# Patient Record
Sex: FEMALE | Race: OTHER | Employment: OTHER | URBAN - METROPOLITAN AREA
[De-identification: names, ages, dates, MRNs, and addresses within clinical notes are randomized per-mention and may not be internally consistent; named-entity substitution may affect disease eponyms.]

---

## 2018-06-15 PROBLEM — Z91.09 POLLEN ALLERGIES: Status: ACTIVE | Noted: 2018-06-15

## 2018-06-15 PROBLEM — F33.1 MODERATE EPISODE OF RECURRENT MAJOR DEPRESSIVE DISORDER (HCC): Status: ACTIVE | Noted: 2018-06-15

## 2018-06-15 PROBLEM — G43.909 MIGRAINE SYNDROME: Status: ACTIVE | Noted: 2018-06-15

## 2018-09-03 PROBLEM — F51.05 INSOMNIA DUE TO MENTAL DISORDER: Status: ACTIVE | Noted: 2018-09-03

## 2018-09-03 PROBLEM — F41.9 ANXIETY DISORDER: Status: ACTIVE | Noted: 2018-09-03

## 2018-09-03 PROBLEM — F33.2 SEVERE EPISODE OF RECURRENT MAJOR DEPRESSIVE DISORDER, WITHOUT PSYCHOTIC FEATURES (HCC): Status: ACTIVE | Noted: 2018-09-03

## 2018-09-03 PROBLEM — F45.42 PAIN DISORDER ASSOCIATED WITH PSYCHOLOGICAL FACTORS AND MEDICAL CONDITION: Status: ACTIVE | Noted: 2018-09-03

## 2018-12-28 ENCOUNTER — HOSPITAL ENCOUNTER (OUTPATIENT)
Dept: MAMMOGRAPHY | Age: 53
Discharge: HOME | End: 2018-12-28
Payer: COMMERCIAL

## 2018-12-28 DIAGNOSIS — Z12.31 BREAST CANCER SCREENING BY MAMMOGRAM: ICD-10-CM

## 2018-12-28 PROCEDURE — 77067 SCR MAMMO BI INCL CAD: CPT

## 2019-01-07 NOTE — PROGRESS NOTES
Dear  Suzanne Basim     Your recent mammogram showed :No mammographic evidence of malignancy.      Recommend annual mammogram in one year.  A reminder letter will be scheduled.     Thanks,  Dr. Ophelia Schaefer

## 2019-01-07 NOTE — PROGRESS NOTES
Dear  Shayan Jerzy     Your recent mammogram showed :No mammographic evidence of malignancy.      Recommend annual mammogram in one year.  A reminder letter will be scheduled.     Thanks,  Dr. Jean Nesbitt

## 2019-05-07 ENCOUNTER — TELEPHONE (OUTPATIENT)
Dept: NUTRITION | Age: 54
End: 2019-05-07

## 2019-05-07 NOTE — TELEPHONE ENCOUNTER
Nutrition Counseling: Contacted pt on 4/8/19, 4/9/19, and 5/7/19. See notes documented in Nutrition Counseling Referral for details. No further follow-up contact from pt. Will close referral for this office.     [address]  [phone]

## 2021-02-18 ENCOUNTER — HOSPITAL ENCOUNTER (OUTPATIENT)
Dept: LAB | Age: 56
Discharge: HOME | End: 2021-02-18
Attending: PSYCHIATRY & NEUROLOGY
Payer: MEDICARE

## 2021-02-18 DIAGNOSIS — Z51.81 THERAPEUTIC DRUG MONITORING: ICD-10-CM

## 2021-02-18 LAB
ALBUMIN SERPL-MCNC: 4.2 G/DL (ref 3.5–5)
ALBUMIN/GLOB SERPL: 1.1 {RATIO} (ref 1.2–3.5)
ALP SERPL-CCNC: 122 U/L (ref 50–130)
ALT SERPL-CCNC: 57 U/L (ref 12–65)
AST SERPL-CCNC: 30 U/L (ref 15–37)
BILIRUB DIRECT SERPL-MCNC: 0.1 MG/DL
BILIRUB SERPL-MCNC: 0.5 MG/DL (ref 0.2–1.1)
GLOBULIN SER CALC-MCNC: 3.8 G/DL (ref 2.3–3.5)
PROT SERPL-MCNC: 8 G/DL (ref 6.3–8.2)

## 2021-02-18 PROCEDURE — 80076 HEPATIC FUNCTION PANEL: CPT

## 2021-02-18 PROCEDURE — 36415 COLL VENOUS BLD VENIPUNCTURE: CPT

## 2021-04-14 ENCOUNTER — TRANSCRIBE ORDER (OUTPATIENT)
Dept: SCHEDULING | Age: 56
End: 2021-04-14

## 2021-04-14 DIAGNOSIS — Z12.31 VISIT FOR SCREENING MAMMOGRAM: Primary | ICD-10-CM

## 2021-09-30 ENCOUNTER — TRANSCRIBE ORDER (OUTPATIENT)
Dept: SCHEDULING | Age: 56
End: 2021-09-30

## 2021-09-30 DIAGNOSIS — Z78.0 MENOPAUSE: ICD-10-CM

## 2021-09-30 DIAGNOSIS — Z12.31 VISIT FOR SCREENING MAMMOGRAM: Primary | ICD-10-CM

## 2022-03-18 PROBLEM — G43.909 MIGRAINE SYNDROME: Status: ACTIVE | Noted: 2018-06-15

## 2022-03-18 PROBLEM — F45.42 PAIN DISORDER ASSOCIATED WITH PSYCHOLOGICAL FACTORS AND MEDICAL CONDITION: Status: ACTIVE | Noted: 2018-09-03

## 2022-03-19 PROBLEM — F51.05 INSOMNIA DUE TO MENTAL DISORDER: Status: ACTIVE | Noted: 2018-09-03

## 2022-03-19 PROBLEM — F33.2 SEVERE EPISODE OF RECURRENT MAJOR DEPRESSIVE DISORDER, WITHOUT PSYCHOTIC FEATURES (HCC): Status: ACTIVE | Noted: 2018-09-03

## 2022-03-19 PROBLEM — F41.9 ANXIETY DISORDER: Status: ACTIVE | Noted: 2018-09-03

## 2022-03-19 PROBLEM — Z91.09 POLLEN ALLERGIES: Status: ACTIVE | Noted: 2018-06-15

## 2022-03-19 PROBLEM — F33.1 MODERATE EPISODE OF RECURRENT MAJOR DEPRESSIVE DISORDER (HCC): Status: ACTIVE | Noted: 2018-06-15

## 2022-04-20 PROBLEM — E11.9 TYPE 2 DIABETES MELLITUS (HCC): Status: ACTIVE | Noted: 2022-04-20

## 2022-08-03 ENCOUNTER — TELEMEDICINE (OUTPATIENT)
Dept: BEHAVIORAL/MENTAL HEALTH CLINIC | Age: 57
End: 2022-08-03

## 2022-08-03 DIAGNOSIS — G89.4 CHRONIC PAIN DISORDER: ICD-10-CM

## 2022-08-03 DIAGNOSIS — F41.1 GENERALIZED ANXIETY DISORDER: ICD-10-CM

## 2022-08-03 DIAGNOSIS — F34.1 DYSTHYMIC DISORDER: ICD-10-CM

## 2022-08-03 DIAGNOSIS — F33.1 MODERATE EPISODE OF RECURRENT MAJOR DEPRESSIVE DISORDER (HCC): Primary | ICD-10-CM

## 2022-08-03 DIAGNOSIS — F51.05 INSOMNIA DUE TO MENTAL DISORDER: ICD-10-CM

## 2022-08-03 DIAGNOSIS — Z63.4 BEREAVEMENT: ICD-10-CM

## 2022-08-03 PROCEDURE — 99213 OFFICE O/P EST LOW 20 MIN: CPT | Performed by: PSYCHIATRY & NEUROLOGY

## 2022-08-03 PROCEDURE — 90833 PSYTX W PT W E/M 30 MIN: CPT | Performed by: PSYCHIATRY & NEUROLOGY

## 2022-08-03 RX ORDER — BUPROPION HYDROCHLORIDE 150 MG/1
150 TABLET ORAL EVERY MORNING
Qty: 30 TABLET | Refills: 2 | Status: SHIPPED | OUTPATIENT
Start: 2022-08-03 | End: 2022-11-02 | Stop reason: SDUPTHER

## 2022-08-03 RX ORDER — EMPAGLIFLOZIN, METFORMIN HYDROCHLORIDE 5; 1000 MG/1; MG/1
TABLET, EXTENDED RELEASE ORAL
COMMUNITY
Start: 2021-02-17

## 2022-08-03 RX ORDER — GABAPENTIN 600 MG/1
600 TABLET ORAL NIGHTLY
COMMUNITY
Start: 2021-09-29

## 2022-08-03 RX ORDER — TRAZODONE HYDROCHLORIDE 100 MG/1
200 TABLET ORAL NIGHTLY
Qty: 60 TABLET | Refills: 2 | Status: SHIPPED | OUTPATIENT
Start: 2022-08-03 | End: 2022-11-02 | Stop reason: SDUPTHER

## 2022-08-03 RX ORDER — ONDANSETRON 8 MG/1
8 TABLET, ORALLY DISINTEGRATING ORAL 3 TIMES DAILY
COMMUNITY
Start: 2021-12-23

## 2022-08-03 RX ORDER — DULOXETIN HYDROCHLORIDE 60 MG/1
CAPSULE, DELAYED RELEASE ORAL
Qty: 30 CAPSULE | Refills: 2 | Status: SHIPPED | OUTPATIENT
Start: 2022-08-03 | End: 2022-11-02 | Stop reason: SDUPTHER

## 2022-08-03 RX ORDER — DULOXETIN HYDROCHLORIDE 30 MG/1
CAPSULE, DELAYED RELEASE ORAL
Qty: 30 CAPSULE | Refills: 3 | Status: SHIPPED | OUTPATIENT
Start: 2022-08-03 | End: 2022-11-02 | Stop reason: SDUPTHER

## 2022-08-03 RX ORDER — SUMATRIPTAN 100 MG/1
TABLET, FILM COATED ORAL
COMMUNITY
Start: 2021-02-16

## 2022-08-03 RX ORDER — LORAZEPAM 0.5 MG/1
0.5 TABLET ORAL 2 TIMES DAILY PRN
Qty: 60 TABLET | Refills: 2 | Status: SHIPPED | OUTPATIENT
Start: 2022-08-03 | End: 2022-11-01

## 2022-08-03 SDOH — SOCIAL STABILITY - SOCIAL INSECURITY: DISSAPEARANCE AND DEATH OF FAMILY MEMBER: Z63.4

## 2022-08-03 ASSESSMENT — ANXIETY QUESTIONNAIRES
7. FEELING AFRAID AS IF SOMETHING AWFUL MIGHT HAPPEN: 3
GAD7 TOTAL SCORE: 17
2. NOT BEING ABLE TO STOP OR CONTROL WORRYING: 3
6. BECOMING EASILY ANNOYED OR IRRITABLE: 1
5. BEING SO RESTLESS THAT IT IS HARD TO SIT STILL: 3
4. TROUBLE RELAXING: 1
1. FEELING NERVOUS, ANXIOUS, OR ON EDGE: 3
IF YOU CHECKED OFF ANY PROBLEMS ON THIS QUESTIONNAIRE, HOW DIFFICULT HAVE THESE PROBLEMS MADE IT FOR YOU TO DO YOUR WORK, TAKE CARE OF THINGS AT HOME, OR GET ALONG WITH OTHER PEOPLE: VERY DIFFICULT
3. WORRYING TOO MUCH ABOUT DIFFERENT THINGS: 3

## 2022-08-03 ASSESSMENT — PATIENT HEALTH QUESTIONNAIRE - PHQ9
SUM OF ALL RESPONSES TO PHQ QUESTIONS 1-9: 16
6. FEELING BAD ABOUT YOURSELF - OR THAT YOU ARE A FAILURE OR HAVE LET YOURSELF OR YOUR FAMILY DOWN: 0
10. IF YOU CHECKED OFF ANY PROBLEMS, HOW DIFFICULT HAVE THESE PROBLEMS MADE IT FOR YOU TO DO YOUR WORK, TAKE CARE OF THINGS AT HOME, OR GET ALONG WITH OTHER PEOPLE: 0
3. TROUBLE FALLING OR STAYING ASLEEP: 3
4. FEELING TIRED OR HAVING LITTLE ENERGY: 3
SUM OF ALL RESPONSES TO PHQ QUESTIONS 1-9: 16
7. TROUBLE CONCENTRATING ON THINGS, SUCH AS READING THE NEWSPAPER OR WATCHING TELEVISION: 3
SUM OF ALL RESPONSES TO PHQ QUESTIONS 1-9: 16
1. LITTLE INTEREST OR PLEASURE IN DOING THINGS: 0
9. THOUGHTS THAT YOU WOULD BE BETTER OFF DEAD, OR OF HURTING YOURSELF: 0
2. FEELING DOWN, DEPRESSED OR HOPELESS: 3
8. MOVING OR SPEAKING SO SLOWLY THAT OTHER PEOPLE COULD HAVE NOTICED. OR THE OPPOSITE, BEING SO FIGETY OR RESTLESS THAT YOU HAVE BEEN MOVING AROUND A LOT MORE THAN USUAL: 3
SUM OF ALL RESPONSES TO PHQ QUESTIONS 1-9: 16
5. POOR APPETITE OR OVEREATING: 1
SUM OF ALL RESPONSES TO PHQ9 QUESTIONS 1 & 2: 3

## 2022-08-03 ASSESSMENT — COLUMBIA-SUICIDE SEVERITY RATING SCALE - C-SSRS
1. WITHIN THE PAST MONTH, HAVE YOU WISHED YOU WERE DEAD OR WISHED YOU COULD GO TO SLEEP AND NOT WAKE UP?: NO
2. HAVE YOU ACTUALLY HAD ANY THOUGHTS OF KILLING YOURSELF?: NO
6. HAVE YOU EVER DONE ANYTHING, STARTED TO DO ANYTHING, OR PREPARED TO DO ANYTHING TO END YOUR LIFE?: NO

## 2022-08-03 NOTE — PROGRESS NOTES
difficult at all       WARREN-7 SCREENING 8/3/2022 1/26/2022 10/7/2021   Feeling nervous, anxious, or on edge Nearly every day - -   Not being able to stop or control worrying Nearly every day - -   Worrying too much about different things Nearly every day - -   Trouble relaxing Several days - -   Being so restless that it is hard to sit still Nearly every day - -   Becoming easily annoyed or irritable Several days - -   Feeling afraid as if something awful might happen Nearly every day - -   WARREN-7 Total Score 17 - -   How difficult have these problems made it for you to do your work, take care of things at home, or get along with other people? Very difficult Extremely difficult Very difficult   Feeling nervous, anxious, or on edge - Several days Several days   WARREN-7 Total Score - 6 13        I was at home while conducting this encounter. Consent:  She and/or her healthcare decision maker is aware that this patient-initiated Telehealth encounter is a billable service, with coverage as determined by her insurance carrier. She is aware that she may receive a bill and has provided verbal consent to proceed: YesPatient identification was verified, and a caregiver was present when appropriate. The patient was located in a state where the provider was credentialed to provide care. This virtual visit was conducted via [place]. Pursuant to the emergency declaration under the [facility], [facility] waiver authority and the [employer] General Act, this Virtual  Visit was conducted to reduce the patient's risk of exposure to COVID-19 and provide continuity of care for an established patient. Services were provided through a video synchronous discussion virtually to substitute for in-person clinic visit. Due to this being a TeleHealth evaluation, many elements of the physical examination are unable to be assessed.      Total Time: minutes: 11-20 minutes. Chief complaint:  Pt says has been bad. Subjective:   Seen virtually for follow-up. States not doing too good. Very depressed and anxious. Was in [place] in  for 1 week to attend a  of her oldest brothers fausto. She was like her grandmother and was 80 yrs old. Her mom has Alzheimer's and is not doing good. Her brother is in [facility], he has a restraining order. He drank and took some pills and was taken to mental hospital then the [facility]. Mom used to get upset and went to the streets screaming and yelling.   has been stressed because of his daughter. She has to be better for her  too. He is okay sometimes. Other times he cries. States her daughter comes and brings her grandchildren and that helps. She has a friend she contacted. Supportive psychotherapy provided. Patient allowed to vent her emotions. She denies suicidal ideation/homicidal ideations. Denies symptoms of psychosis. 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 In no acute distress. MEDICATION REVIEW:    Current Medications:    Current Outpatient Medications   Medication Sig    gabapentin (NEURONTIN) 600 MG tablet Take 600 mg by mouth nightly. Empagliflozin-metFORMIN HCl ER (SYNJARDY XR) 5-1000 MG TB24     ondansetron (ZOFRAN-ODT) 8 MG TBDP disintegrating tablet Place 8 mg under the tongue in the morning and 8 mg at noon and 8 mg before bedtime.     SUMAtriptan (IMITREX) 100 MG tablet     buPROPion (WELLBUTRIN XL) 150 MG extended release tablet Take 1 tablet by mouth every morning    DULoxetine (CYMBALTA) 60 MG extended release capsule Take 60 mg once daily in the morning and 30 mg in the afternoon    LORazepam (ATIVAN) 0.5 MG tablet Take 1 tablet by mouth 2 times daily as needed for Anxiety for up to 90 days. traZODone (DESYREL) 100 MG tablet Take 2 tablets by mouth nightly    DULoxetine (CYMBALTA) 30 MG extended release capsule Take 60 mg once daily in the morning and 30 mg in the afternoon    Lancets MISC Check BS 2 times day ( Before breakfast and 1 to 2 hours after lunch and dinner) Dx: E11.65# 120 - RF x 11    albuterol sulfate  (90 Base) MCG/ACT inhaler Inhale 2 puffs into the lungs    atorvastatin (LIPITOR) 10 MG tablet Take 10 mg by mouth daily    cyclobenzaprine (FLEXERIL) 10 MG tablet Take 10 mg by mouth daily as needed    fenofibrate (TRICOR) 145 MG tablet Take 145 mg by mouth daily    ibuprofen (ADVIL;MOTRIN) 600 MG tablet Take 600 mg by mouth    loratadine (CLARITIN) 10 MG tablet Take 10 mg by mouth    metFORMIN (GLUCOPHAGE) 850 MG tablet Take 850 mg by mouth    metoprolol tartrate (LOPRESSOR) 25 MG tablet Take by mouth 2 times daily    mometasone (NASONEX) 50 MCG/ACT nasal spray 2 sprays daily    montelukast (SINGULAIR) 10 MG tablet Take 10 mg by mouth    ergocalciferol (ERGOCALCIFEROL) 1.25 MG (45537 UT) capsule Take 50,000 Units by mouth every 7 days (Patient not taking: Reported on 8/3/2022)     No current facility-administered medications for this visit. Allergies   Allergen Reactions    Acetaminophen Other (See Comments)    Codeine Rash       Past Medical History, Past Surgical History, Family history, Social History, and Medications were all reviewed with the patient today and updated as necessary.      Compliant with medication: Yes   Side effects from medications:  No     EXAMINATION  Musculoskeletal    GAIT AND STATION   [x] WNL   [] RESTRICTED   [] UNSTEADY WALK        [] ABNORMAL   [] UNBALANCED         PSYCHIATRIC     GENERAL APPEARANCE:   [x]  WELL GROOMED []     DISHEVELED   []  UNKEMPT      []  UNUSUAL/BIZZARE    [] WNL       ATTITUDE:   [x] COOPERATIVE   [] GUARDED   [] SUSPICIOUS      [] HOSTILE                            BEHAVIOR:   [x] CALM   [] HYPERACTIVE   [] MANNERISMS      [] BIZZARE         SPEECH:   [x] NORMAL FOR CLIENT   [] SPONTANEOUS   [] SLURRED   [] WHISPERING      [] LOUD   [] PRESSURED   [] ARTICULATE       EYE CONTACT:   [x] WNL   [] BLANK STARE   [] INTENSE      [] AVOIDANT         MOOD:   [] EUTHYMIC   [x] ANXIOUS   [x] DEPRESSED      [] IRRITABLE   [] ANGRY   [] APATHETIC     AFFECT:   [x] CONGRUENT WITH MOOD   [] FLAT   [] CONSTRICTED      [] INAPPROPRIATE   [] LABILE           THOUGHT PROCESS:   [x] LOGICAL/GOAL-DIRECTED   [] FOI   [] CIRCUMSANTIAL      [] INCOHERENT   [] TANGENTIAL   [] CONCRETE      [] PERSEVERATION           THOUGHT CONTENT:                DELUSIONS  [x] DENIES  [] GRANDIOSE  [] PERSECUTORY  [] Denominational  [] REFERENCE   HALLUCINATIONS  [x] DENIES  [] AUDITORY  [] VISUAL  [] OLFACTORY  [] TACTILE     [] GUSTATORY  [] SOMATIC         OBSESSIONS  [x] DENIES  [] PRESENT         SUICIDAL IDEATION  [x] DENIES  [] PRESENT W/O PLAN  [] PRESENT W/ PLAN       HOMICIDAL IDEATION  [x] DENIES  [] PRESENT W/O PLAN  [] PRESENT W/ PLAN           JUDGEMENT:   [x] GOOD   [] FAIR   [] POOR   INSIGHT:   [x] GOOD   [] FAIR   [] POOR     COGNITION:           SENSORIUM:   [x] ALERT   [] CLOUDED   [] DROWSY     ORIENTATION:   [x] INTACT   [] TIME:  PLACE  PERSON   RECENT & REMOTE MEMORY:   [] NORMAL   [x] OTHER:                  ATTENTION:   [x] INTACT   [] MILD IMPAIRMENT   [] SEVERE IMPAIRMENT     CONCENTRATION:   [x] INTACT   [] MILD IMPAIRMENT   [] SEVERE IMPAIRMENT     LANGUAGE:   [x] AVERAGE   [] ABOVE AVERAGE   [] BELOW AVERAGE     FUND OF KNOWLEDGE:   [] UNABLE TO ASSESS AT THIS TIME   [x] AVERAGE   [] ABOVE AVERAGE   [] BELOW AVERAGE      [] GOOD TO EXCELLENT KNOWLEDGE OF CURRENT EVENTS   [] POOR TO NO KNLEDGE OF CURRENT EVENTS           ABNORMAL MOVEMENTS:   [x] NONE   [] TICS   [] TREMORS   [] BIZZARE      [] FACE   [] TRUNK   [] EXTREMETIES   [] GESTURES        SLEEP:   [] GOOD   [] FAIR   [x] POOR      MUSCLE STRENGTH AND TONE   [x] WNL   [] ATROPHY   [] SPASTIC        [] FLACCID   [] COGWHEEL         Diagnoses/Impressions:    ICD-10-CM    1. Moderate episode of recurrent major depressive disorder (HCC)  F33.1       2. Generalized anxiety disorder  F41.1 LORazepam (ATIVAN) 0.5 MG tablet      3. Insomnia due to mental disorder  F51.05       4. Chronic pain disorder  G89.4       5. Dysthymic disorder  F34.1       6. Bereavement  Z63.4           TREATMENT GOALS:  Symptom reduction, Medication adherence, maintain therapeutic gains    LABS/IMAGING:    []  Ordered [x]  Reviewed []  New Labs Ordered:     LAB  Sodium   Date/Time Value Ref Range Status   05/22/2020 10:19  134 - 144 mmol/L Final     Potassium   Date/Time Value Ref Range Status   05/22/2020 10:19 AM 4.4 3.5 - 5.2 mmol/L Final     Chloride   Date/Time Value Ref Range Status   05/22/2020 10:19  96 - 106 mmol/L Final     CO2   Date/Time Value Ref Range Status   05/22/2020 10:19 AM 21 20 - 29 mmol/L Final     BUN   Date/Time Value Ref Range Status   05/22/2020 10:19 AM 14 6 - 24 mg/dL Final     ALT   Date/Time Value Ref Range Status   02/18/2021 12:20 PM 57 12 - 65 U/L Final     AST   Date/Time Value Ref Range Status   02/18/2021 12:20 PM 30 15 - 37 U/L Final     TSH   Date/Time Value Ref Range Status   02/06/2020 10:33 AM 0.728 0.450 - 4.500 uIU/mL Final       Please refer to the lab tab in the epic and care everywhere for the most recent lab results. Plan:     [x]  Medication ordered: cymbalta, Wellbutrin, lorazepam, trazodone to target depression, anxiety, insomnia.     [x]  Medication education/counseling provided  Medication dosage and time to take, purpose/expected benefits/risks, common side effects, lab monitoring required and reason, E.R. if they are experiencing a medical emergency or suicidal ideations/homicidal ideations. **  All ancillary documentation entered reviewed by provider. PLEASE NOTE:  This document has been produced in part or whole using voice recognition software. Proofread however unrecognized errors in transcription may be present.         Omaira Blackwell MD

## 2022-11-02 ENCOUNTER — TELEMEDICINE (OUTPATIENT)
Dept: BEHAVIORAL/MENTAL HEALTH CLINIC | Age: 57
End: 2022-11-02
Payer: MEDICARE

## 2022-11-02 DIAGNOSIS — F51.05 INSOMNIA DUE TO MENTAL DISORDER: ICD-10-CM

## 2022-11-02 DIAGNOSIS — G89.4 CHRONIC PAIN DISORDER: ICD-10-CM

## 2022-11-02 DIAGNOSIS — F41.1 GENERALIZED ANXIETY DISORDER: ICD-10-CM

## 2022-11-02 DIAGNOSIS — F33.1 MODERATE EPISODE OF RECURRENT MAJOR DEPRESSIVE DISORDER (HCC): Primary | ICD-10-CM

## 2022-11-02 DIAGNOSIS — F34.1 DYSTHYMIC DISORDER: ICD-10-CM

## 2022-11-02 PROCEDURE — 3017F COLORECTAL CA SCREEN DOC REV: CPT | Performed by: PSYCHIATRY & NEUROLOGY

## 2022-11-02 PROCEDURE — G8427 DOCREV CUR MEDS BY ELIG CLIN: HCPCS | Performed by: PSYCHIATRY & NEUROLOGY

## 2022-11-02 PROCEDURE — 99214 OFFICE O/P EST MOD 30 MIN: CPT | Performed by: PSYCHIATRY & NEUROLOGY

## 2022-11-02 RX ORDER — ATORVASTATIN CALCIUM 80 MG/1
TABLET, FILM COATED ORAL
COMMUNITY
Start: 2022-10-31

## 2022-11-02 RX ORDER — BUPROPION HYDROCHLORIDE 150 MG/1
150 TABLET ORAL EVERY MORNING
Qty: 90 TABLET | Refills: 1 | Status: SHIPPED | OUTPATIENT
Start: 2022-11-02

## 2022-11-02 RX ORDER — DULOXETIN HYDROCHLORIDE 30 MG/1
CAPSULE, DELAYED RELEASE ORAL
Qty: 90 CAPSULE | Refills: 1 | Status: SHIPPED | OUTPATIENT
Start: 2022-11-02

## 2022-11-02 RX ORDER — ICOSAPENT ETHYL 1000 MG/1
CAPSULE ORAL
COMMUNITY
Start: 2022-11-01

## 2022-11-02 RX ORDER — TRAZODONE HYDROCHLORIDE 100 MG/1
200 TABLET ORAL NIGHTLY
Qty: 180 TABLET | Refills: 1 | Status: SHIPPED | OUTPATIENT
Start: 2022-11-02

## 2022-11-02 RX ORDER — DULOXETIN HYDROCHLORIDE 60 MG/1
CAPSULE, DELAYED RELEASE ORAL
Qty: 90 CAPSULE | Refills: 1 | Status: SHIPPED | OUTPATIENT
Start: 2022-11-02

## 2022-11-02 ASSESSMENT — ANXIETY QUESTIONNAIRES
4. TROUBLE RELAXING: 3
GAD7 TOTAL SCORE: 14
7. FEELING AFRAID AS IF SOMETHING AWFUL MIGHT HAPPEN: 0
1. FEELING NERVOUS, ANXIOUS, OR ON EDGE: 1
5. BEING SO RESTLESS THAT IT IS HARD TO SIT STILL: 3
IF YOU CHECKED OFF ANY PROBLEMS ON THIS QUESTIONNAIRE, HOW DIFFICULT HAVE THESE PROBLEMS MADE IT FOR YOU TO DO YOUR WORK, TAKE CARE OF THINGS AT HOME, OR GET ALONG WITH OTHER PEOPLE: NOT DIFFICULT AT ALL
3. WORRYING TOO MUCH ABOUT DIFFERENT THINGS: 3
6. BECOMING EASILY ANNOYED OR IRRITABLE: 3
2. NOT BEING ABLE TO STOP OR CONTROL WORRYING: 1

## 2022-11-02 ASSESSMENT — PATIENT HEALTH QUESTIONNAIRE - PHQ9
SUM OF ALL RESPONSES TO PHQ QUESTIONS 1-9: 17
SUM OF ALL RESPONSES TO PHQ QUESTIONS 1-9: 17
3. TROUBLE FALLING OR STAYING ASLEEP: 3
9. THOUGHTS THAT YOU WOULD BE BETTER OFF DEAD, OR OF HURTING YOURSELF: 0
SUM OF ALL RESPONSES TO PHQ QUESTIONS 1-9: 17
5. POOR APPETITE OR OVEREATING: 0
2. FEELING DOWN, DEPRESSED OR HOPELESS: 3
7. TROUBLE CONCENTRATING ON THINGS, SUCH AS READING THE NEWSPAPER OR WATCHING TELEVISION: 3
1. LITTLE INTEREST OR PLEASURE IN DOING THINGS: 3
SUM OF ALL RESPONSES TO PHQ9 QUESTIONS 1 & 2: 6
4. FEELING TIRED OR HAVING LITTLE ENERGY: 1
6. FEELING BAD ABOUT YOURSELF - OR THAT YOU ARE A FAILURE OR HAVE LET YOURSELF OR YOUR FAMILY DOWN: 1
8. MOVING OR SPEAKING SO SLOWLY THAT OTHER PEOPLE COULD HAVE NOTICED. OR THE OPPOSITE, BEING SO FIGETY OR RESTLESS THAT YOU HAVE BEEN MOVING AROUND A LOT MORE THAN USUAL: 3
10. IF YOU CHECKED OFF ANY PROBLEMS, HOW DIFFICULT HAVE THESE PROBLEMS MADE IT FOR YOU TO DO YOUR WORK, TAKE CARE OF THINGS AT HOME, OR GET ALONG WITH OTHER PEOPLE: 2
SUM OF ALL RESPONSES TO PHQ QUESTIONS 1-9: 17
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
## 2023-02-23 ENCOUNTER — TELEPHONE (OUTPATIENT)
Dept: BEHAVIORAL/MENTAL HEALTH CLINIC | Age: 58
End: 2023-02-23

## 2023-02-23 NOTE — TELEPHONE ENCOUNTER
Patient had to go to [place] to be with her mom as she had a heart attack. Patient is unsure when she will return but will call back to reschedule appt when she does. Is requesting 90 day supply of refills to be sent to [facility] order pharmacy.

## 2023-02-25 RX ORDER — TRAZODONE HYDROCHLORIDE 100 MG/1
200 TABLET ORAL NIGHTLY
Qty: 180 TABLET | Refills: 1 | Status: SHIPPED | OUTPATIENT
Start: 2023-02-25

## 2023-02-25 RX ORDER — DULOXETIN HYDROCHLORIDE 60 MG/1
CAPSULE, DELAYED RELEASE ORAL
Qty: 90 CAPSULE | Refills: 1 | Status: SHIPPED | OUTPATIENT
Start: 2023-02-25

## 2023-02-25 RX ORDER — DULOXETIN HYDROCHLORIDE 30 MG/1
CAPSULE, DELAYED RELEASE ORAL
Qty: 90 CAPSULE | Refills: 1 | Status: SHIPPED | OUTPATIENT
Start: 2023-02-25

## 2023-02-25 RX ORDER — BUPROPION HYDROCHLORIDE 150 MG/1
150 TABLET ORAL EVERY MORNING
Qty: 90 TABLET | Refills: 1 | Status: SHIPPED | OUTPATIENT
Start: 2023-02-25

## 2023-10-05 ENCOUNTER — TELEPHONE (OUTPATIENT)
Dept: PRIMARY CARE CLINIC | Facility: CLINIC | Age: 58
End: 2023-10-05

## 2023-10-05 NOTE — TELEPHONE ENCOUNTER
Pt last office visit 11/02/2022, pt had to go back to her country, now pt is in [place] wanting a soon apt, next apt 01/25/2024 (more then 1 year) pt can not do morning apt do to her taking care of mother,pt dont want to come in to office, please call pt back thank you.

## 2023-10-19 ENCOUNTER — TELEMEDICINE (OUTPATIENT)
Dept: BEHAVIORAL/MENTAL HEALTH CLINIC | Age: 58
End: 2023-10-19
Payer: MEDICARE

## 2023-10-19 DIAGNOSIS — Z63.0 PROBLEMS IN RELATIONSHIP WITH SPOUSE OR PARTNER: ICD-10-CM

## 2023-10-19 DIAGNOSIS — Z65.8 PSYCHOSOCIAL STRESSORS: ICD-10-CM

## 2023-10-19 DIAGNOSIS — F41.1 GENERALIZED ANXIETY DISORDER: ICD-10-CM

## 2023-10-19 DIAGNOSIS — G89.4 CHRONIC PAIN DISORDER: ICD-10-CM

## 2023-10-19 DIAGNOSIS — F34.1 DYSTHYMIC DISORDER: ICD-10-CM

## 2023-10-19 DIAGNOSIS — F51.05 INSOMNIA DUE TO MENTAL DISORDER: ICD-10-CM

## 2023-10-19 DIAGNOSIS — F33.0 MILD EPISODE OF RECURRENT MAJOR DEPRESSIVE DISORDER (HCC): Primary | ICD-10-CM

## 2023-10-19 PROCEDURE — 3017F COLORECTAL CA SCREEN DOC REV: CPT | Performed by: PSYCHIATRY & NEUROLOGY

## 2023-10-19 PROCEDURE — 90833 PSYTX W PT W E/M 30 MIN: CPT | Performed by: PSYCHIATRY & NEUROLOGY

## 2023-10-19 PROCEDURE — 99213 OFFICE O/P EST LOW 20 MIN: CPT | Performed by: PSYCHIATRY & NEUROLOGY

## 2023-10-19 PROCEDURE — G8427 DOCREV CUR MEDS BY ELIG CLIN: HCPCS | Performed by: PSYCHIATRY & NEUROLOGY

## 2023-10-19 RX ORDER — DULOXETIN HYDROCHLORIDE 30 MG/1
CAPSULE, DELAYED RELEASE ORAL
Qty: 90 CAPSULE | Refills: 1 | Status: SHIPPED | OUTPATIENT
Start: 2023-10-19

## 2023-10-19 RX ORDER — DULOXETIN HYDROCHLORIDE 60 MG/1
CAPSULE, DELAYED RELEASE ORAL
Qty: 90 CAPSULE | Refills: 1 | Status: SHIPPED | OUTPATIENT
Start: 2023-10-19

## 2023-10-19 RX ORDER — TRAZODONE HYDROCHLORIDE 100 MG/1
100 TABLET ORAL NIGHTLY
Qty: 90 TABLET | Refills: 1 | Status: SHIPPED | OUTPATIENT
Start: 2023-10-19

## 2023-10-19 RX ORDER — LORAZEPAM 0.5 MG/1
0.5 TABLET ORAL
COMMUNITY

## 2023-10-19 RX ORDER — CEPHALEXIN 500 MG/1
CAPSULE ORAL
COMMUNITY
Start: 2023-10-09

## 2023-10-19 RX ORDER — BUPROPION HYDROCHLORIDE 150 MG/1
150 TABLET ORAL EVERY MORNING
Qty: 90 TABLET | Refills: 1 | Status: SHIPPED | OUTPATIENT
Start: 2023-10-19

## 2023-10-19 SDOH — SOCIAL STABILITY - SOCIAL INSECURITY: PROBLEMS IN RELATIONSHIP WITH SPOUSE OR PARTNER: Z63.0

## 2023-10-19 ASSESSMENT — PATIENT HEALTH QUESTIONNAIRE - PHQ9
SUM OF ALL RESPONSES TO PHQ QUESTIONS 1-9: 14
6. FEELING BAD ABOUT YOURSELF - OR THAT YOU ARE A FAILURE OR HAVE LET YOURSELF OR YOUR FAMILY DOWN: 0
5. POOR APPETITE OR OVEREATING: 1
4. FEELING TIRED OR HAVING LITTLE ENERGY: 3
SUM OF ALL RESPONSES TO PHQ9 QUESTIONS 1 & 2: 1
SUM OF ALL RESPONSES TO PHQ QUESTIONS 1-9: 14
10. IF YOU CHECKED OFF ANY PROBLEMS, HOW DIFFICULT HAVE THESE PROBLEMS MADE IT FOR YOU TO DO YOUR WORK, TAKE CARE OF THINGS AT HOME, OR GET ALONG WITH OTHER PEOPLE: 2
1. LITTLE INTEREST OR PLEASURE IN DOING THINGS: 0
9. THOUGHTS THAT YOU WOULD BE BETTER OFF DEAD, OR OF HURTING YOURSELF: 0
SUM OF ALL RESPONSES TO PHQ QUESTIONS 1-9: 14
3. TROUBLE FALLING OR STAYING ASLEEP: 3
7. TROUBLE CONCENTRATING ON THINGS, SUCH AS READING THE NEWSPAPER OR WATCHING TELEVISION: 3
2. FEELING DOWN, DEPRESSED OR HOPELESS: 1
SUM OF ALL RESPONSES TO PHQ QUESTIONS 1-9: 14
8. MOVING OR SPEAKING SO SLOWLY THAT OTHER PEOPLE COULD HAVE NOTICED. OR THE OPPOSITE, BEING SO FIGETY OR RESTLESS THAT YOU HAVE BEEN MOVING AROUND A LOT MORE THAN USUAL: 3

## 2023-10-19 ASSESSMENT — ANXIETY QUESTIONNAIRES
6. BECOMING EASILY ANNOYED OR IRRITABLE: 1
1. FEELING NERVOUS, ANXIOUS, OR ON EDGE: 1
3. WORRYING TOO MUCH ABOUT DIFFERENT THINGS: 3
7. FEELING AFRAID AS IF SOMETHING AWFUL MIGHT HAPPEN: 0
2. NOT BEING ABLE TO STOP OR CONTROL WORRYING: 1
IF YOU CHECKED OFF ANY PROBLEMS ON THIS QUESTIONNAIRE, HOW DIFFICULT HAVE THESE PROBLEMS MADE IT FOR YOU TO DO YOUR WORK, TAKE CARE OF THINGS AT HOME, OR GET ALONG WITH OTHER PEOPLE: SOMEWHAT DIFFICULT
4. TROUBLE RELAXING: 3

## 2024-01-25 ENCOUNTER — TELEMEDICINE (OUTPATIENT)
Dept: BEHAVIORAL/MENTAL HEALTH CLINIC | Age: 59
End: 2024-01-25
Payer: MEDICARE

## 2024-01-25 DIAGNOSIS — F41.1 GENERALIZED ANXIETY DISORDER: ICD-10-CM

## 2024-01-25 DIAGNOSIS — F34.1 DYSTHYMIC DISORDER: ICD-10-CM

## 2024-01-25 DIAGNOSIS — Z65.8 PSYCHOSOCIAL STRESSORS: ICD-10-CM

## 2024-01-25 DIAGNOSIS — F51.05 INSOMNIA DUE TO MENTAL DISORDER: ICD-10-CM

## 2024-01-25 DIAGNOSIS — F33.1 MODERATE EPISODE OF RECURRENT MAJOR DEPRESSIVE DISORDER (HCC): Primary | ICD-10-CM

## 2024-01-25 DIAGNOSIS — G89.4 CHRONIC PAIN DISORDER: ICD-10-CM

## 2024-01-25 DIAGNOSIS — Z63.0 PROBLEMS IN RELATIONSHIP WITH SPOUSE OR PARTNER: ICD-10-CM

## 2024-01-25 DIAGNOSIS — R79.89 ABNORMAL LFTS: ICD-10-CM

## 2024-01-25 PROCEDURE — 99214 OFFICE O/P EST MOD 30 MIN: CPT | Performed by: PSYCHIATRY & NEUROLOGY

## 2024-01-25 PROCEDURE — G8427 DOCREV CUR MEDS BY ELIG CLIN: HCPCS | Performed by: PSYCHIATRY & NEUROLOGY

## 2024-01-25 PROCEDURE — 3017F COLORECTAL CA SCREEN DOC REV: CPT | Performed by: PSYCHIATRY & NEUROLOGY

## 2024-01-25 RX ORDER — BUPROPION HYDROCHLORIDE 150 MG/1
150 TABLET ORAL EVERY MORNING
Qty: 90 TABLET | Refills: 1 | Status: SHIPPED | OUTPATIENT
Start: 2024-01-25

## 2024-01-25 RX ORDER — DULOXETIN HYDROCHLORIDE 60 MG/1
CAPSULE, DELAYED RELEASE ORAL
Qty: 90 CAPSULE | Refills: 1 | Status: SHIPPED | OUTPATIENT
Start: 2024-01-25

## 2024-01-25 RX ORDER — TRAZODONE HYDROCHLORIDE 100 MG/1
100 TABLET ORAL NIGHTLY
Qty: 90 TABLET | Refills: 1 | Status: SHIPPED | OUTPATIENT
Start: 2024-01-25

## 2024-01-25 RX ORDER — DULOXETIN HYDROCHLORIDE 30 MG/1
CAPSULE, DELAYED RELEASE ORAL
Qty: 90 CAPSULE | Refills: 1 | Status: SHIPPED | OUTPATIENT
Start: 2024-01-25

## 2024-01-25 SDOH — SOCIAL STABILITY - SOCIAL INSECURITY: PROBLEMS IN RELATIONSHIP WITH SPOUSE OR PARTNER: Z63.0

## 2024-01-25 ASSESSMENT — ANXIETY QUESTIONNAIRES
3. WORRYING TOO MUCH ABOUT DIFFERENT THINGS: 3
5. BEING SO RESTLESS THAT IT IS HARD TO SIT STILL: 2
7. FEELING AFRAID AS IF SOMETHING AWFUL MIGHT HAPPEN: 1
IF YOU CHECKED OFF ANY PROBLEMS ON THIS QUESTIONNAIRE, HOW DIFFICULT HAVE THESE PROBLEMS MADE IT FOR YOU TO DO YOUR WORK, TAKE CARE OF THINGS AT HOME, OR GET ALONG WITH OTHER PEOPLE: EXTREMELY DIFFICULT
6. BECOMING EASILY ANNOYED OR IRRITABLE: 2
2. NOT BEING ABLE TO STOP OR CONTROL WORRYING: 3
4. TROUBLE RELAXING: 2
GAD7 TOTAL SCORE: 15
1. FEELING NERVOUS, ANXIOUS, OR ON EDGE: 2

## 2024-01-25 ASSESSMENT — PATIENT HEALTH QUESTIONNAIRE - PHQ9
SUM OF ALL RESPONSES TO PHQ QUESTIONS 1-9: 17
5. POOR APPETITE OR OVEREATING: 1
1. LITTLE INTEREST OR PLEASURE IN DOING THINGS: 2
SUM OF ALL RESPONSES TO PHQ QUESTIONS 1-9: 17
2. FEELING DOWN, DEPRESSED OR HOPELESS: 2
SUM OF ALL RESPONSES TO PHQ9 QUESTIONS 1 & 2: 4
SUM OF ALL RESPONSES TO PHQ QUESTIONS 1-9: 17
9. THOUGHTS THAT YOU WOULD BE BETTER OFF DEAD, OR OF HURTING YOURSELF: 0
SUM OF ALL RESPONSES TO PHQ QUESTIONS 1-9: 17
4. FEELING TIRED OR HAVING LITTLE ENERGY: 3
3. TROUBLE FALLING OR STAYING ASLEEP: 3
8. MOVING OR SPEAKING SO SLOWLY THAT OTHER PEOPLE COULD HAVE NOTICED. OR THE OPPOSITE, BEING SO FIGETY OR RESTLESS THAT YOU HAVE BEEN MOVING AROUND A LOT MORE THAN USUAL: 1
10. IF YOU CHECKED OFF ANY PROBLEMS, HOW DIFFICULT HAVE THESE PROBLEMS MADE IT FOR YOU TO DO YOUR WORK, TAKE CARE OF THINGS AT HOME, OR GET ALONG WITH OTHER PEOPLE: 3
6. FEELING BAD ABOUT YOURSELF - OR THAT YOU ARE A FAILURE OR HAVE LET YOURSELF OR YOUR FAMILY DOWN: 2
7. TROUBLE CONCENTRATING ON THINGS, SUCH AS READING THE NEWSPAPER OR WATCHING TELEVISION: 3

## 2024-01-25 NOTE — PROGRESS NOTES
were reviewed using CBT techniques in order to increase insight and decrease anxiety.    Dysfunctional cognitions addressed and alternate options dicussed.      []  Disposition planning      []  Dangerous and will not contract for safety in the community    **Pateint has been notified: They are to call 911 or go to their nearest E.R. if they are experiencing a medical emergency or suicidal ideations/homicidal ideations.**  All ancillary documentation entered reviewed by provider.      PLEASE NOTE:  This document has been produced in part or whole using voice recognition software. Proofread however unrecognized errors in transcription may be present.    MD Maday De Leon, was evaluated through a synchronous (real-time) audio-video encounter. The patient (or guardian if applicable) is aware that this is a billable service, which includes applicable co-pays. This Virtual Visit was conducted with patient's (and/or legal guardian's) consent. Patient identification was verified, and a caregiver was present when appropriate.   The patient was located at Home: [address] [place] [address]   [facility] [id number]  Provider was located at Facility (Appt Dept): [address] [place] [phone]         Total time spent for this encounter: Not billed by time    --Cheli Lambert MD on 1/25/2024 at 3:45 PM    An electronic signature was used to authenticate this note.

## 2024-05-07 ENCOUNTER — TELEPHONE (OUTPATIENT)
Dept: BEHAVIORAL/MENTAL HEALTH CLINIC | Age: 59
End: 2024-05-07

## 2024-05-07 NOTE — TELEPHONE ENCOUNTER
Patient called to request sooner appt. She missed her last appt. Offered to schedule for Monday 5/13 at 8:40 and patient declined appt, stated that she was having company and could not do that. Offered 5/14 at 4:20 and declined this as well. Let her know that we has some openings on 5/17. Patient stated that she wanted to be seen on 5/9 or 5/10. I advised that we do not currently have anything available for those days that the first available would be Monday 5/13. She again declined this appt. Line disconnected at this point. I attempted to call patient back but got voice mail.

## 2024-05-13 ENCOUNTER — TELEMEDICINE (OUTPATIENT)
Dept: BEHAVIORAL/MENTAL HEALTH CLINIC | Age: 59
End: 2024-05-13
Payer: MEDICARE

## 2024-05-13 DIAGNOSIS — G89.4 CHRONIC PAIN DISORDER: ICD-10-CM

## 2024-05-13 DIAGNOSIS — F34.1 DYSTHYMIC DISORDER: ICD-10-CM

## 2024-05-13 DIAGNOSIS — F33.0 MILD EPISODE OF RECURRENT MAJOR DEPRESSIVE DISORDER (HCC): Primary | ICD-10-CM

## 2024-05-13 DIAGNOSIS — R79.89 ABNORMAL LFTS: ICD-10-CM

## 2024-05-13 DIAGNOSIS — Z63.0 PROBLEMS IN RELATIONSHIP WITH SPOUSE OR PARTNER: ICD-10-CM

## 2024-05-13 DIAGNOSIS — F41.1 GENERALIZED ANXIETY DISORDER: ICD-10-CM

## 2024-05-13 DIAGNOSIS — Z65.8 PSYCHOSOCIAL STRESSORS: ICD-10-CM

## 2024-05-13 DIAGNOSIS — F51.05 INSOMNIA DUE TO MENTAL DISORDER: ICD-10-CM

## 2024-05-13 PROCEDURE — 99214 OFFICE O/P EST MOD 30 MIN: CPT | Performed by: PSYCHIATRY & NEUROLOGY

## 2024-05-13 PROCEDURE — 3017F COLORECTAL CA SCREEN DOC REV: CPT | Performed by: PSYCHIATRY & NEUROLOGY

## 2024-05-13 PROCEDURE — G8427 DOCREV CUR MEDS BY ELIG CLIN: HCPCS | Performed by: PSYCHIATRY & NEUROLOGY

## 2024-05-13 RX ORDER — BUPROPION HYDROCHLORIDE 150 MG/1
150 TABLET ORAL EVERY MORNING
Qty: 90 TABLET | Refills: 1 | Status: SHIPPED | OUTPATIENT
Start: 2024-05-13

## 2024-05-13 RX ORDER — DULOXETIN HYDROCHLORIDE 30 MG/1
CAPSULE, DELAYED RELEASE ORAL
Qty: 90 CAPSULE | Refills: 1 | Status: SHIPPED | OUTPATIENT
Start: 2024-05-13

## 2024-05-13 RX ORDER — DULOXETIN HYDROCHLORIDE 60 MG/1
CAPSULE, DELAYED RELEASE ORAL
Qty: 90 CAPSULE | Refills: 1 | Status: SHIPPED | OUTPATIENT
Start: 2024-05-13

## 2024-05-13 RX ORDER — TRAZODONE HYDROCHLORIDE 100 MG/1
100 TABLET ORAL NIGHTLY
Qty: 90 TABLET | Refills: 1 | Status: SHIPPED | OUTPATIENT
Start: 2024-05-13

## 2024-05-13 SDOH — SOCIAL STABILITY - SOCIAL INSECURITY: PROBLEMS IN RELATIONSHIP WITH SPOUSE OR PARTNER: Z63.0

## 2024-05-13 ASSESSMENT — ANXIETY QUESTIONNAIRES
4. TROUBLE RELAXING: NEARLY EVERY DAY
7. FEELING AFRAID AS IF SOMETHING AWFUL MIGHT HAPPEN: MORE THAN HALF THE DAYS
GAD7 TOTAL SCORE: 15
3. WORRYING TOO MUCH ABOUT DIFFERENT THINGS: NEARLY EVERY DAY
6. BECOMING EASILY ANNOYED OR IRRITABLE: MORE THAN HALF THE DAYS
1. FEELING NERVOUS, ANXIOUS, OR ON EDGE: NEARLY EVERY DAY
2. NOT BEING ABLE TO STOP OR CONTROL WORRYING: SEVERAL DAYS
IF YOU CHECKED OFF ANY PROBLEMS ON THIS QUESTIONNAIRE, HOW DIFFICULT HAVE THESE PROBLEMS MADE IT FOR YOU TO DO YOUR WORK, TAKE CARE OF THINGS AT HOME, OR GET ALONG WITH OTHER PEOPLE: SOMEWHAT DIFFICULT
5. BEING SO RESTLESS THAT IT IS HARD TO SIT STILL: SEVERAL DAYS

## 2024-05-13 ASSESSMENT — PATIENT HEALTH QUESTIONNAIRE - PHQ9
SUM OF ALL RESPONSES TO PHQ QUESTIONS 1-9: 14
4. FEELING TIRED OR HAVING LITTLE ENERGY: NEARLY EVERY DAY
SUM OF ALL RESPONSES TO PHQ9 QUESTIONS 1 & 2: 3
5. POOR APPETITE OR OVEREATING: MORE THAN HALF THE DAYS
SUM OF ALL RESPONSES TO PHQ QUESTIONS 1-9: 14
SUM OF ALL RESPONSES TO PHQ QUESTIONS 1-9: 14
1. LITTLE INTEREST OR PLEASURE IN DOING THINGS: NOT AT ALL
10. IF YOU CHECKED OFF ANY PROBLEMS, HOW DIFFICULT HAVE THESE PROBLEMS MADE IT FOR YOU TO DO YOUR WORK, TAKE CARE OF THINGS AT HOME, OR GET ALONG WITH OTHER PEOPLE: SOMEWHAT DIFFICULT
SUM OF ALL RESPONSES TO PHQ QUESTIONS 1-9: 14
9. THOUGHTS THAT YOU WOULD BE BETTER OFF DEAD, OR OF HURTING YOURSELF: NOT AT ALL
3. TROUBLE FALLING OR STAYING ASLEEP: NEARLY EVERY DAY
6. FEELING BAD ABOUT YOURSELF - OR THAT YOU ARE A FAILURE OR HAVE LET YOURSELF OR YOUR FAMILY DOWN: SEVERAL DAYS
8. MOVING OR SPEAKING SO SLOWLY THAT OTHER PEOPLE COULD HAVE NOTICED. OR THE OPPOSITE, BEING SO FIGETY OR RESTLESS THAT YOU HAVE BEEN MOVING AROUND A LOT MORE THAN USUAL: SEVERAL DAYS
2. FEELING DOWN, DEPRESSED OR HOPELESS: NEARLY EVERY DAY
7. TROUBLE CONCENTRATING ON THINGS, SUCH AS READING THE NEWSPAPER OR WATCHING TELEVISION: SEVERAL DAYS

## 2024-05-13 NOTE — PROGRESS NOTES
UNABLE TO ASSESS AT THIS TIME   [x] AVERAGE   [] ABOVE AVERAGE   [] BELOW AVERAGE      [] GOOD TO EXCELLENT KNOWLEDGE OF CURRENT EVENTS   [] POOR TO NO KNLEDGE OF CURRENT EVENTS                                          ABNORMAL MOVEMENTS:   [x] NONE   [] TICS   [] TREMORS   [] BIZZARE      [] FACE   [] TRUNK   [] EXTREMETIES   [] GESTURES        SLEEP:   [] GOOD   [] FAIR   [x] POOR      APPETITE:   [] GOOD   [] POOR   [x] ERRATIC       MUSCLE STRENGTH AND TONE   [] WNL   [] ATROPHY   [] SPASTIC        [] FLACCID   [] COGWHEEL       Diagnoses/Impressions:    ICD-10-CM    1. Moderate episode of recurrent major depressive disorder (HCC)  F33.1       2. Generalized anxiety disorder  F41.1       3. Insomnia due to mental disorder  F51.05       4. Chronic pain disorder  G89.4       5. Dysthymic disorder  F34.1       6. Psychosocial stressors  Z65.8       7. Problems in relationship with spouse or partner  Z63.0       8. Abnormal LFTs  R79.89           TREATMENT GOALS:  Symptom reduction, Medication adherence, maintain therapeutic gains    LABS/IMAGING:    []  Ordered [x]  Reviewed []  New Labs Ordered:     LAB  Sodium   Date/Time Value Ref Range Status   05/22/2020 10:19  134 - 144 mmol/L Final     Potassium   Date/Time Value Ref Range Status   05/22/2020 10:19 AM 4.4 3.5 - 5.2 mmol/L Final     Chloride   Date/Time Value Ref Range Status   05/22/2020 10:19  96 - 106 mmol/L Final     CO2   Date/Time Value Ref Range Status   05/22/2020 10:19 AM 21 20 - 29 mmol/L Final     BUN   Date/Time Value Ref Range Status   05/22/2020 10:19 AM 14 6 - 24 mg/dL Final     ALT   Date/Time Value Ref Range Status   02/18/2021 12:20 PM 57 12 - 65 U/L Final     AST   Date/Time Value Ref Range Status   02/18/2021 12:20 PM 30 15 - 37 U/L Final     TSH   Date/Time Value Ref Range Status   02/06/2020 10:33 AM 0.728 0.450 - 4.500 uIU/mL Final       Please refer to the lab tab in the epic and care everywhere for the most recent lab

## 2024-07-17 ENCOUNTER — TELEMEDICINE (OUTPATIENT)
Dept: BEHAVIORAL/MENTAL HEALTH CLINIC | Age: 59
End: 2024-07-17
Payer: MEDICARE

## 2024-07-17 DIAGNOSIS — F41.1 GENERALIZED ANXIETY DISORDER: ICD-10-CM

## 2024-07-17 DIAGNOSIS — Z65.8 PSYCHOSOCIAL STRESSORS: ICD-10-CM

## 2024-07-17 DIAGNOSIS — G89.4 CHRONIC PAIN DISORDER: ICD-10-CM

## 2024-07-17 DIAGNOSIS — F51.05 INSOMNIA DUE TO MENTAL DISORDER: ICD-10-CM

## 2024-07-17 DIAGNOSIS — F34.1 DYSTHYMIC DISORDER: ICD-10-CM

## 2024-07-17 DIAGNOSIS — F33.0 MILD EPISODE OF RECURRENT MAJOR DEPRESSIVE DISORDER (HCC): Primary | ICD-10-CM

## 2024-07-17 PROCEDURE — G8427 DOCREV CUR MEDS BY ELIG CLIN: HCPCS | Performed by: PSYCHIATRY & NEUROLOGY

## 2024-07-17 PROCEDURE — 99214 OFFICE O/P EST MOD 30 MIN: CPT | Performed by: PSYCHIATRY & NEUROLOGY

## 2024-07-17 PROCEDURE — 3017F COLORECTAL CA SCREEN DOC REV: CPT | Performed by: PSYCHIATRY & NEUROLOGY

## 2024-07-17 RX ORDER — DULOXETIN HYDROCHLORIDE 60 MG/1
60 CAPSULE, DELAYED RELEASE ORAL DAILY
Qty: 90 CAPSULE | Refills: 1 | Status: SHIPPED | OUTPATIENT
Start: 2024-07-17

## 2024-07-17 RX ORDER — SEMAGLUTIDE 0.68 MG/ML
0.5 INJECTION, SOLUTION SUBCUTANEOUS
COMMUNITY
Start: 2024-06-19

## 2024-07-17 RX ORDER — BUPROPION HYDROCHLORIDE 150 MG/1
150 TABLET ORAL EVERY MORNING
Qty: 90 TABLET | Refills: 1 | Status: SHIPPED | OUTPATIENT
Start: 2024-07-17

## 2024-07-17 RX ORDER — TRAZODONE HYDROCHLORIDE 100 MG/1
100 TABLET ORAL NIGHTLY
Qty: 90 TABLET | Refills: 1 | Status: SHIPPED | OUTPATIENT
Start: 2024-07-17

## 2024-07-17 ASSESSMENT — ANXIETY QUESTIONNAIRES
2. NOT BEING ABLE TO STOP OR CONTROL WORRYING: NOT AT ALL
4. TROUBLE RELAXING: NEARLY EVERY DAY
1. FEELING NERVOUS, ANXIOUS, OR ON EDGE: SEVERAL DAYS
5. BEING SO RESTLESS THAT IT IS HARD TO SIT STILL: NEARLY EVERY DAY
7. FEELING AFRAID AS IF SOMETHING AWFUL MIGHT HAPPEN: SEVERAL DAYS
GAD7 TOTAL SCORE: 11
IF YOU CHECKED OFF ANY PROBLEMS ON THIS QUESTIONNAIRE, HOW DIFFICULT HAVE THESE PROBLEMS MADE IT FOR YOU TO DO YOUR WORK, TAKE CARE OF THINGS AT HOME, OR GET ALONG WITH OTHER PEOPLE: SOMEWHAT DIFFICULT
6. BECOMING EASILY ANNOYED OR IRRITABLE: SEVERAL DAYS
3. WORRYING TOO MUCH ABOUT DIFFERENT THINGS: MORE THAN HALF THE DAYS

## 2024-07-17 ASSESSMENT — PATIENT HEALTH QUESTIONNAIRE - PHQ9
3. TROUBLE FALLING OR STAYING ASLEEP: NEARLY EVERY DAY
1. LITTLE INTEREST OR PLEASURE IN DOING THINGS: NEARLY EVERY DAY
10. IF YOU CHECKED OFF ANY PROBLEMS, HOW DIFFICULT HAVE THESE PROBLEMS MADE IT FOR YOU TO DO YOUR WORK, TAKE CARE OF THINGS AT HOME, OR GET ALONG WITH OTHER PEOPLE: SOMEWHAT DIFFICULT
SUM OF ALL RESPONSES TO PHQ QUESTIONS 1-9: 14
9. THOUGHTS THAT YOU WOULD BE BETTER OFF DEAD, OR OF HURTING YOURSELF: NOT AT ALL
7. TROUBLE CONCENTRATING ON THINGS, SUCH AS READING THE NEWSPAPER OR WATCHING TELEVISION: SEVERAL DAYS
4. FEELING TIRED OR HAVING LITTLE ENERGY: NEARLY EVERY DAY
SUM OF ALL RESPONSES TO PHQ QUESTIONS 1-9: 14
SUM OF ALL RESPONSES TO PHQ QUESTIONS 1-9: 14
SUM OF ALL RESPONSES TO PHQ9 QUESTIONS 1 & 2: 5
5. POOR APPETITE OR OVEREATING: NOT AT ALL
6. FEELING BAD ABOUT YOURSELF - OR THAT YOU ARE A FAILURE OR HAVE LET YOURSELF OR YOUR FAMILY DOWN: NOT AT ALL
2. FEELING DOWN, DEPRESSED OR HOPELESS: MORE THAN HALF THE DAYS
8. MOVING OR SPEAKING SO SLOWLY THAT OTHER PEOPLE COULD HAVE NOTICED. OR THE OPPOSITE, BEING SO FIGETY OR RESTLESS THAT YOU HAVE BEEN MOVING AROUND A LOT MORE THAN USUAL: MORE THAN HALF THE DAYS
SUM OF ALL RESPONSES TO PHQ QUESTIONS 1-9: 14

## 2024-07-17 NOTE — PROGRESS NOTES
dementia.  Supportive psychotherapy provided.  Patient allowed to vent her emotions.  She denies suicidal ideation/homicidal ideations.  Denies symptoms of psychosis.  I had ordered repeat LFTs for her but she did not get it done.  States could not go and get it done.  Will decrease the dose of Cymbalta to 60 mg daily.  Patient states that she was asked to stop all medications before her surgery.  Advised against abrupt discontinuation of medications.      Patient Active Problem List   Diagnosis    Pain disorder associated with psychological factors and medical condition    Migraine syndrome    Insomnia due to mental disorder    Severe episode of recurrent major depressive disorder, without psychotic features (HCC)    Fibromyalgia    Moderate episode of recurrent major depressive disorder (HCC)    Anxiety disorder    Pollen allergies    Type 2 diabetes mellitus (HCC)       Denies palpitation,SOB, Chest pain, headaches. In no acute distress.     There were no vitals taken for this visit.      MEDICATION REVIEW:    Current Medications:    Current Outpatient Medications   Medication Sig    OZEMPIC, 0.25 OR 0.5 MG/DOSE, 2 MG/3ML SOPN Inject 0.5 mLs into the skin every 7 days    traZODone (DESYREL) 100 MG tablet Take 1 tablet by mouth nightly    DULoxetine (CYMBALTA) 60 MG extended release capsule Take 1 capsule by mouth daily    buPROPion (WELLBUTRIN XL) 150 MG extended release tablet Take 1 tablet by mouth every morning    DULoxetine (CYMBALTA) 30 MG extended release capsule Take 60 mg once daily in the morning and 30 mg in the afternoon    LORazepam (ATIVAN) 0.5 MG tablet Take 1 tablet by mouth.    albuterol sulfate  (90 Base) MCG/ACT inhaler Inhale 2 puffs into the lungs    cephALEXin (KEFLEX) 500 MG capsule  (Patient not taking: Reported on 7/17/2024)    atorvastatin (LIPITOR) 80 MG tablet     Icosapent Ethyl (VASCEPA) 1 g CAPS capsule     gabapentin (NEURONTIN) 600 MG tablet Take 1 tablet by mouth nightly.

## 2024-11-14 RX ORDER — DULOXETIN HYDROCHLORIDE 30 MG/1
CAPSULE, DELAYED RELEASE ORAL
Qty: 90 CAPSULE | Refills: 1 | OUTPATIENT
Start: 2024-11-14

## 2025-03-05 ENCOUNTER — TELEMEDICINE (OUTPATIENT)
Dept: BEHAVIORAL/MENTAL HEALTH CLINIC | Age: 60
End: 2025-03-05

## 2025-03-05 DIAGNOSIS — G89.4 CHRONIC PAIN DISORDER: ICD-10-CM

## 2025-03-05 DIAGNOSIS — F51.05 INSOMNIA DUE TO MENTAL DISORDER: ICD-10-CM

## 2025-03-05 DIAGNOSIS — F33.41 RECURRENT MAJOR DEPRESSIVE DISORDER, IN PARTIAL REMISSION: Primary | ICD-10-CM

## 2025-03-05 DIAGNOSIS — Z65.8 PSYCHOSOCIAL STRESSORS: ICD-10-CM

## 2025-03-05 DIAGNOSIS — F41.1 GENERALIZED ANXIETY DISORDER: ICD-10-CM

## 2025-03-05 RX ORDER — DULOXETIN HYDROCHLORIDE 30 MG/1
CAPSULE, DELAYED RELEASE ORAL
Qty: 90 CAPSULE | Refills: 1 | Status: SHIPPED | OUTPATIENT
Start: 2025-03-05

## 2025-03-05 RX ORDER — CLONAZEPAM 0.5 MG/1
0.5 TABLET ORAL DAILY PRN
Qty: 30 TABLET | Refills: 2 | Status: SHIPPED | OUTPATIENT
Start: 2025-03-05 | End: 2025-06-03

## 2025-03-05 RX ORDER — TRAZODONE HYDROCHLORIDE 100 MG/1
100 TABLET ORAL NIGHTLY
Qty: 90 TABLET | Refills: 1 | Status: CANCELLED | OUTPATIENT
Start: 2025-03-05

## 2025-03-05 RX ORDER — BUPROPION HYDROCHLORIDE 150 MG/1
150 TABLET ORAL EVERY MORNING
Qty: 90 TABLET | Refills: 1 | Status: SHIPPED | OUTPATIENT
Start: 2025-03-05

## 2025-03-05 RX ORDER — DULOXETIN HYDROCHLORIDE 60 MG/1
60 CAPSULE, DELAYED RELEASE ORAL DAILY
Qty: 90 CAPSULE | Refills: 1 | Status: SHIPPED | OUTPATIENT
Start: 2025-03-05

## 2025-03-05 ASSESSMENT — PATIENT HEALTH QUESTIONNAIRE - PHQ9
SUM OF ALL RESPONSES TO PHQ QUESTIONS 1-9: 5
SUM OF ALL RESPONSES TO PHQ QUESTIONS 1-9: 5
1. LITTLE INTEREST OR PLEASURE IN DOING THINGS: NOT AT ALL
4. FEELING TIRED OR HAVING LITTLE ENERGY: SEVERAL DAYS
8. MOVING OR SPEAKING SO SLOWLY THAT OTHER PEOPLE COULD HAVE NOTICED. OR THE OPPOSITE, BEING SO FIGETY OR RESTLESS THAT YOU HAVE BEEN MOVING AROUND A LOT MORE THAN USUAL: SEVERAL DAYS
2. FEELING DOWN, DEPRESSED OR HOPELESS: SEVERAL DAYS
SUM OF ALL RESPONSES TO PHQ QUESTIONS 1-9: 5
7. TROUBLE CONCENTRATING ON THINGS, SUCH AS READING THE NEWSPAPER OR WATCHING TELEVISION: SEVERAL DAYS
3. TROUBLE FALLING OR STAYING ASLEEP: SEVERAL DAYS
SUM OF ALL RESPONSES TO PHQ QUESTIONS 1-9: 5
5. POOR APPETITE OR OVEREATING: NOT AT ALL
6. FEELING BAD ABOUT YOURSELF - OR THAT YOU ARE A FAILURE OR HAVE LET YOURSELF OR YOUR FAMILY DOWN: NOT AT ALL
9. THOUGHTS THAT YOU WOULD BE BETTER OFF DEAD, OR OF HURTING YOURSELF: NOT AT ALL
10. IF YOU CHECKED OFF ANY PROBLEMS, HOW DIFFICULT HAVE THESE PROBLEMS MADE IT FOR YOU TO DO YOUR WORK, TAKE CARE OF THINGS AT HOME, OR GET ALONG WITH OTHER PEOPLE: NOT DIFFICULT AT ALL

## 2025-03-05 ASSESSMENT — ANXIETY QUESTIONNAIRES
GAD7 TOTAL SCORE: 9
4. TROUBLE RELAXING: NEARLY EVERY DAY
6. BECOMING EASILY ANNOYED OR IRRITABLE: SEVERAL DAYS
IF YOU CHECKED OFF ANY PROBLEMS ON THIS QUESTIONNAIRE, HOW DIFFICULT HAVE THESE PROBLEMS MADE IT FOR YOU TO DO YOUR WORK, TAKE CARE OF THINGS AT HOME, OR GET ALONG WITH OTHER PEOPLE: SOMEWHAT DIFFICULT
3. WORRYING TOO MUCH ABOUT DIFFERENT THINGS: SEVERAL DAYS
1. FEELING NERVOUS, ANXIOUS, OR ON EDGE: NOT AT ALL
5. BEING SO RESTLESS THAT IT IS HARD TO SIT STILL: NEARLY EVERY DAY
7. FEELING AFRAID AS IF SOMETHING AWFUL MIGHT HAPPEN: SEVERAL DAYS
2. NOT BEING ABLE TO STOP OR CONTROL WORRYING: NOT AT ALL

## 2025-03-05 NOTE — PROGRESS NOTES
Patient:  Maday Walker  Age:  59 y.o.  :  1965     SEX:  female MRN:  [id number]     RACE:  /      SEEN:  [x]  PATIENT  []  SPOUSE []  OTHER:                  3/5/2025     9:36 AM 2024     2:23 PM 2024     8:34 AM   PHQ-9    Little interest or pleasure in doing things 0 3 0   Feeling down, depressed, or hopeless 1 2 3   Trouble falling or staying asleep, or sleeping too much 1 3 3   Feeling tired or having little energy 1 3 3   Poor appetite or overeating 0 0 2   Feeling bad about yourself - or that you are a failure or have let yourself or your family down 0 0 1   Trouble concentrating on things, such as reading the newspaper or watching television 1 1 1   Moving or speaking so slowly that other people could have noticed. Or the opposite - being so fidgety or restless that you have been moving around a lot more than usual 1 2 1   Thoughts that you would be better off dead, or of hurting yourself in some way 0 0 0   PHQ-2 Score 1 5 3   PHQ-9 Total Score 5 14 14   If you checked off any problems, how difficult have these problems made it for you to do your work, take care of things at home, or get along with other people? 0 1 1           3/5/2025     9:39 AM 2024     2:26 PM 2024     8:37 AM   WARREN-7 SCREENING   Feeling nervous, anxious, or on edge Not at all Several days Nearly every day   Not being able to stop or control worrying Not at all Not at all Several days   Worrying too much about different things Several days More than half the days Nearly every day   Trouble relaxing Nearly every day Nearly every day Nearly every day   Being so restless that it is hard to sit still Nearly every day Nearly every day Several days   Becoming easily annoyed or irritable Several days Several days More than half the days   Feeling afraid as if something awful might happen Several days Several days More than half the days   WARREN-7 Total Score 9 11 15   How difficult have

## 2025-06-02 ASSESSMENT — ANXIETY QUESTIONNAIRES
3. WORRYING TOO MUCH ABOUT DIFFERENT THINGS: MORE THAN HALF THE DAYS
5. BEING SO RESTLESS THAT IT IS HARD TO SIT STILL: SEVERAL DAYS
5. BEING SO RESTLESS THAT IT IS HARD TO SIT STILL: SEVERAL DAYS
6. BECOMING EASILY ANNOYED OR IRRITABLE: MORE THAN HALF THE DAYS
7. FEELING AFRAID AS IF SOMETHING AWFUL MIGHT HAPPEN: MORE THAN HALF THE DAYS
2. NOT BEING ABLE TO STOP OR CONTROL WORRYING: MORE THAN HALF THE DAYS
IF YOU CHECKED OFF ANY PROBLEMS ON THIS QUESTIONNAIRE, HOW DIFFICULT HAVE THESE PROBLEMS MADE IT FOR YOU TO DO YOUR WORK, TAKE CARE OF THINGS AT HOME, OR GET ALONG WITH OTHER PEOPLE: SOMEWHAT DIFFICULT
4. TROUBLE RELAXING: SEVERAL DAYS
2. NOT BEING ABLE TO STOP OR CONTROL WORRYING: MORE THAN HALF THE DAYS
IF YOU CHECKED OFF ANY PROBLEMS ON THIS QUESTIONNAIRE, HOW DIFFICULT HAVE THESE PROBLEMS MADE IT FOR YOU TO DO YOUR WORK, TAKE CARE OF THINGS AT HOME, OR GET ALONG WITH OTHER PEOPLE: SOMEWHAT DIFFICULT
1. FEELING NERVOUS, ANXIOUS, OR ON EDGE: MORE THAN HALF THE DAYS
3. WORRYING TOO MUCH ABOUT DIFFERENT THINGS: MORE THAN HALF THE DAYS
6. BECOMING EASILY ANNOYED OR IRRITABLE: MORE THAN HALF THE DAYS
1. FEELING NERVOUS, ANXIOUS, OR ON EDGE: MORE THAN HALF THE DAYS
7. FEELING AFRAID AS IF SOMETHING AWFUL MIGHT HAPPEN: MORE THAN HALF THE DAYS
GAD7 TOTAL SCORE: 12
4. TROUBLE RELAXING: SEVERAL DAYS

## 2025-06-02 ASSESSMENT — PATIENT HEALTH QUESTIONNAIRE - PHQ9
2. FEELING DOWN, DEPRESSED OR HOPELESS: MORE THAN HALF THE DAYS
3. TROUBLE FALLING OR STAYING ASLEEP: MORE THAN HALF THE DAYS
10. IF YOU CHECKED OFF ANY PROBLEMS, HOW DIFFICULT HAVE THESE PROBLEMS MADE IT FOR YOU TO DO YOUR WORK, TAKE CARE OF THINGS AT HOME, OR GET ALONG WITH OTHER PEOPLE: SOMEWHAT DIFFICULT
5. POOR APPETITE OR OVEREATING: MORE THAN HALF THE DAYS
3. TROUBLE FALLING OR STAYING ASLEEP: MORE THAN HALF THE DAYS
2. FEELING DOWN, DEPRESSED OR HOPELESS: MORE THAN HALF THE DAYS
SUM OF ALL RESPONSES TO PHQ QUESTIONS 1-9: 16
6. FEELING BAD ABOUT YOURSELF - OR THAT YOU ARE A FAILURE OR HAVE LET YOURSELF OR YOUR FAMILY DOWN: MORE THAN HALF THE DAYS
6. FEELING BAD ABOUT YOURSELF - OR THAT YOU ARE A FAILURE OR HAVE LET YOURSELF OR YOUR FAMILY DOWN: MORE THAN HALF THE DAYS
8. MOVING OR SPEAKING SO SLOWLY THAT OTHER PEOPLE COULD HAVE NOTICED. OR THE OPPOSITE - BEING SO FIDGETY OR RESTLESS THAT YOU HAVE BEEN MOVING AROUND A LOT MORE THAN USUAL: MORE THAN HALF THE DAYS
1. LITTLE INTEREST OR PLEASURE IN DOING THINGS: MORE THAN HALF THE DAYS
SUM OF ALL RESPONSES TO PHQ QUESTIONS 1-9: 16
SUM OF ALL RESPONSES TO PHQ QUESTIONS 1-9: 16
5. POOR APPETITE OR OVEREATING: MORE THAN HALF THE DAYS
8. MOVING OR SPEAKING SO SLOWLY THAT OTHER PEOPLE COULD HAVE NOTICED. OR THE OPPOSITE, BEING SO FIGETY OR RESTLESS THAT YOU HAVE BEEN MOVING AROUND A LOT MORE THAN USUAL: MORE THAN HALF THE DAYS
9. THOUGHTS THAT YOU WOULD BE BETTER OFF DEAD, OR OF HURTING YOURSELF: NOT AT ALL
1. LITTLE INTEREST OR PLEASURE IN DOING THINGS: MORE THAN HALF THE DAYS
10. IF YOU CHECKED OFF ANY PROBLEMS, HOW DIFFICULT HAVE THESE PROBLEMS MADE IT FOR YOU TO DO YOUR WORK, TAKE CARE OF THINGS AT HOME, OR GET ALONG WITH OTHER PEOPLE: SOMEWHAT DIFFICULT
7. TROUBLE CONCENTRATING ON THINGS, SUCH AS READING THE NEWSPAPER OR WATCHING TELEVISION: MORE THAN HALF THE DAYS
7. TROUBLE CONCENTRATING ON THINGS, SUCH AS READING THE NEWSPAPER OR WATCHING TELEVISION: MORE THAN HALF THE DAYS
4. FEELING TIRED OR HAVING LITTLE ENERGY: MORE THAN HALF THE DAYS
SUM OF ALL RESPONSES TO PHQ QUESTIONS 1-9: 16
SUM OF ALL RESPONSES TO PHQ QUESTIONS 1-9: 16
9. THOUGHTS THAT YOU WOULD BE BETTER OFF DEAD, OR OF HURTING YOURSELF: NOT AT ALL
4. FEELING TIRED OR HAVING LITTLE ENERGY: MORE THAN HALF THE DAYS

## 2025-06-03 ENCOUNTER — TELEMEDICINE (OUTPATIENT)
Dept: BEHAVIORAL/MENTAL HEALTH CLINIC | Age: 60
End: 2025-06-03
Payer: MEDICARE

## 2025-06-03 DIAGNOSIS — F33.1 MODERATE EPISODE OF RECURRENT MAJOR DEPRESSIVE DISORDER (HCC): Primary | ICD-10-CM

## 2025-06-03 DIAGNOSIS — Z91.148 POOR COMPLIANCE WITH MEDICATION: ICD-10-CM

## 2025-06-03 DIAGNOSIS — Z65.8 PSYCHOSOCIAL STRESSORS: ICD-10-CM

## 2025-06-03 DIAGNOSIS — F41.1 GENERALIZED ANXIETY DISORDER: ICD-10-CM

## 2025-06-03 DIAGNOSIS — F51.05 INSOMNIA DUE TO MENTAL DISORDER: ICD-10-CM

## 2025-06-03 DIAGNOSIS — G89.4 CHRONIC PAIN DISORDER: ICD-10-CM

## 2025-06-03 PROCEDURE — 99214 OFFICE O/P EST MOD 30 MIN: CPT | Performed by: PSYCHIATRY & NEUROLOGY

## 2025-06-03 RX ORDER — CLONAZEPAM 0.5 MG/1
0.5 TABLET ORAL DAILY PRN
Qty: 30 TABLET | Refills: 2 | Status: CANCELLED | OUTPATIENT
Start: 2025-06-03 | End: 2025-09-01

## 2025-06-03 RX ORDER — DULOXETIN HYDROCHLORIDE 60 MG/1
60 CAPSULE, DELAYED RELEASE ORAL DAILY
Qty: 90 CAPSULE | Refills: 1 | Status: SHIPPED | OUTPATIENT
Start: 2025-06-03

## 2025-06-03 RX ORDER — BUPROPION HYDROCHLORIDE 150 MG/1
150 TABLET ORAL EVERY MORNING
Qty: 90 TABLET | Refills: 1 | Status: SHIPPED | OUTPATIENT
Start: 2025-06-03

## 2025-06-03 RX ORDER — TRAZODONE HYDROCHLORIDE 50 MG/1
50 TABLET ORAL NIGHTLY
Qty: 30 TABLET | Refills: 3 | Status: SHIPPED | OUTPATIENT
Start: 2025-06-03

## 2025-06-03 RX ORDER — DULOXETIN HYDROCHLORIDE 30 MG/1
CAPSULE, DELAYED RELEASE ORAL
Qty: 90 CAPSULE | Refills: 1 | Status: SHIPPED | OUTPATIENT
Start: 2025-06-03

## 2025-06-03 NOTE — PROGRESS NOTES
05/22/2020 10:19 AM 14 6 - 24 mg/dL Final     ALT   Date/Time Value Ref Range Status   02/18/2021 12:20 PM 57 12 - 65 U/L Final     AST   Date/Time Value Ref Range Status   02/18/2021 12:20 PM 30 15 - 37 U/L Final     TSH   Date/Time Value Ref Range Status   02/06/2020 10:33 AM 0.728 0.450 - 4.500 uIU/mL Final       Please refer to the lab tab in the epic and care everywhere for the most recent lab results.    Plan:        [x]  Medication ordered: Cymbalta, Wellbutrin, Klonopin to target depression, anxiety, insomnia.     [x]  Medication education/counseling provided   Medication dosage and time to take, purpose/expected benefits/risks, common side effects, lab monitoring required and reason, expected length of treatment, risk of no treatment, financial availability discussed. Educated patient on  side effects/risks/benefits of meds including cardiac arrhythmias, suicidal ideations,  orthostatic hypotension, serotonin syndrome, risk of everardo/hypomania from antidepressants, withdrawals from abrupt discontinuation of meds, liver toxicity, risk of bleeding, risk of seizures, addiction potential, memory impairment with long term use of benzos, respiratory depression, high blood pressure, dizziness, drowsiness, sedation , risk of falls, Risk & benefits discussed: including but not limited to possible off-label medication usage.     [x] Patient encouraged to contact the clinic if experiencing any adverse reactions with medications.     [x] Follow [facility] for sxs improvement     I have reviewed the patient’s controlled substance prescription history, as maintained in the [place] prescription monitoring program, so that the prescription(s) for a  controlled substance can be given.    Recommendations and Referrals:    Follow up with : MD, requires monitoring of response to medication, requires monitoring of medication side effects.    Time until next PMA: 3 months    Follow up with Mental Health Clinicians recommended

## 2025-09-02 ENCOUNTER — TELEPHONE (OUTPATIENT)
Dept: BEHAVIORAL/MENTAL HEALTH CLINIC | Age: 60
End: 2025-09-02